# Patient Record
(demographics unavailable — no encounter records)

---

## 2024-10-11 NOTE — BEGINNING OF VISIT
[Father] : father [] :  [[employer] ] : provided by [employer]   [Interpreters_IDNumber] : 10/11/2024 -interp#[id number] [TWNoteComboBox1] : [place]

## 2024-10-11 NOTE — DISCUSSION/SUMMARY
[School] : school [Development and Mental Health] : development and mental health [Nutrition and Physical Activity] : nutrition and physical activity [Oral Health] : oral health [Safety] : safety [Full Activity without restrictions including Physical Education & Athletics] : Full Activity without restrictions including Physical Education & Athletics [I have examined the above-named student and completed the preparticipation physical evaluation. The athlete does not present apparent clinical contraindications to practice and participate in sport(s) as outlined above. A copy of the physical exam is on r] : I have examined the above-named student and completed the preparticipation physical evaluation. The athlete does not present apparent clinical contraindications to practice and participate in sport(s) as outlined above. A copy of the physical exam is on record in my office and can be made available to the school at the request of the parents. If conditions arise after the athlete has been cleared for participation, the physician may rescind the clearance until the problem is resolved and the potential consequences are completely explained to the athlete (and parents/guardians). [FreeTextEntry1] : 9 year old M no reported PMH recently immigrated from [facility] 1 year ago presenting for HCM. Development normal, BMI 95%, childhood obesity. Healthy diet reviewed at length. PE with excessive skin on the pen[place] region. Vision screen passed. Immunizations delayed, sent in by school for hep B and varicella vaccine.   PLAN - Routine care & anticipatory guidance given - Immunizations: Hep B and varicella vaccines given as letter requested by school. Father explained in Polish interp to bring in further vaccine record from school.  - CHILDHOOD OBESITY: Dietary counseling provided. Healthy diet and lifestyle discussed at length. Avoid sweetened beverages, juice, or soda. My plate planner reviewed. Limit milk to 16 oz/day, use 1% or 2% milk. Portion control reviewed. Include high fiber and whole foods in diet. Exercise counseling: Increase physical activity (bicycle, sports, gymnastics), or interactive electronic programs. Discussed future implications of elevated BMI including risk of metabolic syndrome, diabetes, hypertension, sleep apnea, fatty liver, heart disease and stroke. If any new symptoms, onset of polyuria, polydipsia, fruity odor of breath, abdominal pain, changes of gait, changes in mental status or new symptoms occur seek immediate medical attention. - Labs ordered - Referred to audiology & dental for routine screens - Pediatric Urology referral for excessive skin  - Return to clinic in 3 months for weight check. - RTC for 10 year old HCM and prn  Caretaker expressed understanding of the plan and agrees. All questions were answered.

## 2024-10-11 NOTE — PHYSICAL EXAM
[Alert] : alert [No Acute Distress] : no acute distress [Normocephalic] : normocephalic [Conjunctivae with no discharge] : conjunctivae with no discharge [PERRL] : PERRL [EOMI Bilateral] : EOMI bilateral [Auricles Well Formed] : auricles well formed [Clear Tympanic membranes with present light reflex and bony landmarks] : clear tympanic membranes with present light reflex and bony landmarks [No Discharge] : no discharge [Nares Patent] : nares patent [Pink Nasal Mucosa] : pink nasal mucosa [Palate Intact] : palate intact [Supple, full passive range of motion] : supple, full passive range of motion [No Palpable Masses] : no palpable masses [Symmetric Chest Rise] : symmetric chest rise [Clear to Auscultation Bilaterally] : clear to auscultation bilaterally [Regular Rate and Rhythm] : regular rate and rhythm [Normal S1, S2 present] : normal S1, S2 present [No Murmurs] : no murmurs [+2 Femoral Pulses] : +2 femoral pulses [Soft] : soft [NonTender] : non tender [Non Distended] : non distended [Normoactive Bowel Sounds] : normoactive bowel sounds [No Hepatomegaly] : no hepatomegaly [No Splenomegaly] : no splenomegaly [No Abnormal Lymph Nodes Palpated] : no abnormal lymph nodes palpated [No Gait Asymmetry] : no gait asymmetry [No pain or deformities with palpation of bone, muscles, joints] : no pain or deformities with palpation of bone, muscles, joints [Normal Muscle Tone] : normal muscle tone [Straight] : straight [+2 Patella DTR] : +2 patella DTR [Cranial Nerves Grossly Intact] : cranial nerves grossly intact [No Rash or Lesions] : no rash or lesions [FreeTextEntry6] : excessive skin on circumsised genitalia

## 2024-10-11 NOTE — HISTORY OF PRESENT ILLNESS
[Father] : father [Sugar drinks] : sugar drinks [Fruit] : fruit [Vegetables] : vegetables [Meat] : meat [Grains] : grains [Dairy] : dairy [Eats meals with family] : eats meals with family [___ stools per day] : [unfilled]  stools per day [Normal] : Normal [In own bed] : In own bed [Sleeps ___ hours per night] : sleeps [unfilled] hours per night [No] : Patient does not go to dentist yearly [Playtime (60 min/d)] : playtime 60 min a day [Appropiate parent-child-sibling interaction] : appropriate parent-child-sibling interaction [Has Friends] : has friends [Has chance to make own decisions] : has chance to make own decisions [Grade ___] : Grade [unfilled] [Adequate social interactions] : adequate social interactions [Adequate behavior] : adequate behavior [Adequate performance] : adequate performance [Adequate attention] : adequate attention [Yes] : Cigarette smoke exposure [Delayed] : delayed [Appropriately restrained in motor vehicle] : appropriately restrained in motor vehicle [Supervised outdoor play] : supervised outdoor play [Supervised around water] : supervised around water [Wears helmet and pads] : wears helmet and pads [Parent knows child's friends] : parent knows child's friends [Parent discusses safety rules regarding adults] : parent discusses safety rules regarding adults [Family discusses home emergency plan] : family discusses home emergency plan [Monitored computer use] : monitored computer use [NO] : No [Exposure to tobacco] : no exposure to tobacco [Exposure to alcohol] : no exposure to alcohol [Exposure to electronic nicotine delivery system] : No exposure to electronic nicotine delivery system [Exposure to illicit drugs] : no exposure to illicit drugs [de-identified] : Sometimes  [FreeTextEntry1] : Came to [place] about 1 year ago because school sent us for vaccines. Lived in [facility]. Sent in by school because he needs hep B and varicella.   PMH: Father denies. PSH: Father denies. Meds: Father denies. Allergies: Father denies. BH: FT, , no NICU stay/complications FH: None SH: Lives with dad, 3 sisters, stepmom. No pets. Dad smokes outside Development: 4th grade. Developmentally appropriate. No services.  Vaccines:  Immunizations delayed, sent in by school for hep B and varicella vaccine.